# Patient Record
Sex: MALE | Race: AMERICAN INDIAN OR ALASKA NATIVE
[De-identification: names, ages, dates, MRNs, and addresses within clinical notes are randomized per-mention and may not be internally consistent; named-entity substitution may affect disease eponyms.]

---

## 2018-10-31 ENCOUNTER — HOSPITAL ENCOUNTER (OUTPATIENT)
Dept: HOSPITAL 95 - PLD | Age: 78
Discharge: HOME | End: 2018-10-31
Attending: DERMATOLOGY
Payer: MEDICARE

## 2018-10-31 DIAGNOSIS — L28.0: Primary | ICD-10-CM

## 2019-02-21 ENCOUNTER — HOSPITAL ENCOUNTER (INPATIENT)
Dept: HOSPITAL 95 - ER | Age: 79
LOS: 12 days | Discharge: NURSING, REHAB OR LONG-TERM CARE | DRG: 870 | End: 2019-03-05
Attending: HOSPITALIST | Admitting: HOSPITALIST
Payer: MEDICARE

## 2019-02-21 VITALS — WEIGHT: 206.57 LBS | HEIGHT: 67.99 IN | BODY MASS INDEX: 31.31 KG/M2

## 2019-02-21 DIAGNOSIS — Z79.82: ICD-10-CM

## 2019-02-21 DIAGNOSIS — R41.0: ICD-10-CM

## 2019-02-21 DIAGNOSIS — J18.9: ICD-10-CM

## 2019-02-21 DIAGNOSIS — Z87.891: ICD-10-CM

## 2019-02-21 DIAGNOSIS — D69.6: ICD-10-CM

## 2019-02-21 DIAGNOSIS — I11.0: ICD-10-CM

## 2019-02-21 DIAGNOSIS — J96.01: ICD-10-CM

## 2019-02-21 DIAGNOSIS — I48.91: ICD-10-CM

## 2019-02-21 DIAGNOSIS — R65.20: ICD-10-CM

## 2019-02-21 DIAGNOSIS — E87.0: ICD-10-CM

## 2019-02-21 DIAGNOSIS — Z78.1: ICD-10-CM

## 2019-02-21 DIAGNOSIS — Z95.5: ICD-10-CM

## 2019-02-21 DIAGNOSIS — E11.9: ICD-10-CM

## 2019-02-21 DIAGNOSIS — I50.33: ICD-10-CM

## 2019-02-21 DIAGNOSIS — A41.9: Primary | ICD-10-CM

## 2019-02-21 LAB
ALBUMIN SERPL BCP-MCNC: 3.6 G/DL (ref 3.4–5)
ALBUMIN/GLOB SERPL: 0.7 {RATIO} (ref 0.8–1.8)
ALT SERPL W P-5'-P-CCNC: 50 U/L (ref 12–78)
ANION GAP SERPL CALCULATED.4IONS-SCNC: 7 MMOL/L (ref 6–16)
ANION GAP SERPL CALCULATED.4IONS-SCNC: 9 MMOL/L (ref 6–16)
AST SERPL W P-5'-P-CCNC: 54 U/L (ref 12–37)
BASOPHILS # BLD AUTO: 0.08 K/MM3 (ref 0–0.23)
BASOPHILS NFR BLD AUTO: 0 % (ref 0–2)
BILIRUB SERPL-MCNC: 2.3 MG/DL (ref 0.1–1)
BUN SERPL-MCNC: 22 MG/DL (ref 8–24)
BUN SERPL-MCNC: 26 MG/DL (ref 8–24)
CALCIUM SERPL-MCNC: 8.4 MG/DL (ref 8.5–10.1)
CALCIUM SERPL-MCNC: 9 MG/DL (ref 8.5–10.1)
CHLORIDE SERPL-SCNC: 106 MMOL/L (ref 98–108)
CHLORIDE SERPL-SCNC: 107 MMOL/L (ref 98–108)
CO2 SERPL-SCNC: 21 MMOL/L (ref 21–32)
CO2 SERPL-SCNC: 22 MMOL/L (ref 21–32)
CREAT SERPL-MCNC: 0.81 MG/DL (ref 0.6–1.2)
CREAT SERPL-MCNC: 0.91 MG/DL (ref 0.6–1.2)
DEPRECATED RDW RBC AUTO: 64.3 FL (ref 35.1–46.3)
DEPRECATED RDW RBC AUTO: 66.5 FL (ref 35.1–46.3)
EOSINOPHIL # BLD AUTO: 0.18 K/MM3 (ref 0–0.68)
EOSINOPHIL NFR BLD AUTO: 1 % (ref 0–6)
ERYTHROCYTE [DISTWIDTH] IN BLOOD BY AUTOMATED COUNT: 16.9 % (ref 11.7–14.2)
ERYTHROCYTE [DISTWIDTH] IN BLOOD BY AUTOMATED COUNT: 17 % (ref 11.7–14.2)
GLOBULIN SER CALC-MCNC: 5.3 G/DL (ref 2.2–4)
GLUCOSE SERPL-MCNC: 178 MG/DL (ref 70–99)
GLUCOSE SERPL-MCNC: 179 MG/DL (ref 70–99)
HCT VFR BLD AUTO: 33.8 % (ref 37–53)
HCT VFR BLD AUTO: 41.2 % (ref 37–53)
HCT VFR BLD AUTO: 43.2 % (ref 37–53)
HGB BLD-MCNC: 10.7 G/DL (ref 13.5–17.5)
HGB BLD-MCNC: 12.7 G/DL (ref 13.5–17.5)
HGB BLD-MCNC: 13.2 G/DL (ref 13.5–17.5)
IMM GRANULOCYTES # BLD AUTO: 0.09 K/MM3 (ref 0–0.1)
IMM GRANULOCYTES NFR BLD AUTO: 0 % (ref 0–1)
LYMPHOCYTES # BLD AUTO: 2.58 K/MM3 (ref 0.84–5.2)
LYMPHOCYTES NFR BLD AUTO: 12 % (ref 21–46)
MCHC RBC AUTO-ENTMCNC: 30.6 G/DL (ref 31.5–36.5)
MCHC RBC AUTO-ENTMCNC: 31.7 G/DL (ref 31.5–36.5)
MCV RBC AUTO: 103 FL (ref 80–100)
MCV RBC AUTO: 106 FL (ref 80–100)
MONOCYTES # BLD AUTO: 2.27 K/MM3 (ref 0.16–1.47)
MONOCYTES NFR BLD AUTO: 11 % (ref 4–13)
NEUTROPHILS # BLD AUTO: 15.81 K/MM3 (ref 1.96–9.15)
NEUTROPHILS NFR BLD AUTO: 75 % (ref 41–73)
NRBC # BLD AUTO: 0 K/MM3 (ref 0–0.02)
NRBC # BLD AUTO: 0.02 K/MM3 (ref 0–0.02)
NRBC BLD AUTO-RTO: 0 /100 WBC (ref 0–0.2)
NRBC BLD AUTO-RTO: 0.1 /100 WBC (ref 0–0.2)
PH BLDA: 7.24 [PH] (ref 7.35–7.45)
PH BLDA: 7.24 [PH] (ref 7.35–7.45)
PLATELET # BLD AUTO: 115 K/MM3 (ref 150–400)
PLATELET # BLD AUTO: 88 K/MM3 (ref 150–400)
POTASSIUM SERPL-SCNC: 5 MMOL/L (ref 3.5–5.5)
POTASSIUM SERPL-SCNC: 5.1 MMOL/L (ref 3.5–5.5)
PROT SERPL-MCNC: 8.9 G/DL (ref 6.4–8.2)
PROT UR STRIP-MCNC: (no result) MG/DL
SODIUM SERPL-SCNC: 136 MMOL/L (ref 136–145)
SODIUM SERPL-SCNC: 136 MMOL/L (ref 136–145)
SP GR SPEC: 1.02 (ref 1–1.02)
TROPONIN I SERPL-MCNC: 0.01 NG/ML (ref 0–0.04)
UROBILINOGEN UR STRIP-MCNC: (no result) MG/DL

## 2019-02-21 PROCEDURE — 5A1955Z RESPIRATORY VENTILATION, GREATER THAN 96 CONSECUTIVE HOURS: ICD-10-PCS | Performed by: HOSPITALIST

## 2019-02-21 PROCEDURE — 0BH17EZ INSERTION OF ENDOTRACHEAL AIRWAY INTO TRACHEA, VIA NATURAL OR ARTIFICIAL OPENING: ICD-10-PCS | Performed by: HOSPITALIST

## 2019-02-21 PROCEDURE — C9113 INJ PANTOPRAZOLE SODIUM, VIA: HCPCS

## 2019-02-21 PROCEDURE — C1751 CATH, INF, PER/CENT/MIDLINE: HCPCS

## 2019-02-21 NOTE — NUR
SHIFT SUMMARY:
 
APPROVAL RECIEVED TO USE PICC LINE AND OG TUBE. TUBE FEED STARTED PER DIETARY
RECOMMENDATIONS. PICC LINE PLACED BY AGUSTO AND SONDRA RNS. FAMILY LEFT DURING
INTUBATION. VENT SETTINGS AC 18/450/100%/5. AC CHANGED AFTER DR LOTT
REVIEWED ABG RESULT. PLAN FOR SECOND RESULT TOMORROW AM. LEVOPHED AT
10MCG/MIN. PROPOFOL GTT RUNNING. DELGADILLO CATHETER DRAINING  FOR OUTPUT
NOTED. REPORT GIVEN TO HERNANDEZ SCHMIDT. NO FURTHER NEEDS OR CONCERNS NOTED AT THIS
TIME

## 2019-02-21 NOTE — NUR
DELGADILLO PLACED FOR I AND O AND TEMP MONITORING. PT NOTED TO BE DIAPHORETIC.
DISCUSSED WITH DR LOTT. HR CONTROLLED AFTER CARDIZEM BOLUS AND CARDIZEM GTT
AT 15MG.HR. NOTED TO BE IN AFLUTTER SINCE EVENT AT 1030. DR LOTT AWARE OF
RHYTHM CHANGE AND CURRENT STATUS. TECH IN ROOM PERFORMING PERIPHERAL DOPPLERS.
FAMILY STEPPED OUT FOR PROCEDURES.

## 2019-02-21 NOTE — NUR
ASSUMED CARE OF PT
PT BROUGHT TO ICU FROM ED ON BIPAP. PT ALERT AND ABLE TO NOD YES/NO TO
QUESTIONS. PT ON CARDIZEM DRIP AT 10 AND NS @125.  PT'S WIFE AT BEDSIDE ABLE
TO ANSWER PT MEDICAL HX QUESTIONS. PT COARSE THROUGHOUT, PT'S WIFE STATES THAT
PT HAS HAD COUGH FOR UNKNOWN PERIOD OF TIME AND IS ABLE TO PRODUCE SPUTUM ON
OCCASION.  PT'S WIFE STATES PT DOES NOT USE OXYGEN. PT HAS BECOME INCREASINGLY
WEAK OVER THE PAST WEEK AND PT HAS NEEDED TO USE A WALKER TO ASSIST WITH
AMBULATION. PT HYPOTENSIVE WITH MAP BETWEEN 50-70. SEE FULL SHIFT ASSESSMENT.

## 2019-02-21 NOTE — NUR
PT'S BLOOD PRESSURE CONTINUED TO DECREASE. DR LOTT AWARE, INSTRUCTED TO STOP
CARDIZEM AT THIS TIME. PLANNING TO INTUBATE. RT AND CHARGE RN AWARE

## 2019-02-21 NOTE — NUR
ASSUMED CARE:
 
PT RESTING IN BED, C/O BIPAP MASK AND FEELING CLAUSTERPHOBIC, WANTS IT
REMOVED, AT 12/6 CURRENTLY. EXPLAINED THAT THIS IS NEEDED AT THIS TIME AND A
BREAK WILL BE GIVEN SHORTLY. PT REMAINS HYPOTENSIVE DISCUSSED WITH CHARGE RN,
CALL TO DR WHITE. FLUID BOLUS STARTED,  STATES HE WILL BE HERE TO SEE PT IN
FEW MINUTES. FAMILY AWARE

## 2019-02-21 NOTE — NUR
AFTER 600MLS, PT'S BP HAS NOT IMPROVED. CALL TO DR NICANOR CALVO TO
BOLUS 2 MORE LITERS AND IF NO IMPROVEMENT AFTER 1L, PICC WILL NEED PLACED AND
LEVOPHED STARTED. STATES HE WILL BE HERE TO SEE PT SHORTLY

## 2019-02-21 NOTE — NUR
DR LOTT CAME IN TO SEE PT AND DECIDED THAT INTUBATION WAS NEEDED. FAMILY
EXITED ROOM. AT 1515 PROPOFOL STARTED FOR SEDATION. TUBE IN AT 1519. TUBE SIZE
8.0 WITH 26 AT THE LIP. ET SETTINGS AC 15/450/100%/5
PROPOFOL RUNNING AT 20 MCG/MIN, RESTRAINTS APPLIED. AT
1530 BLOOD PRESSURE DECREASED WITH SBP IN 50S. PROPOFOL TURNED DOWN TO 15
MCG/MIN, LR BOLUS STARTED. AT 1551 PROPOFOL TURNED DOWN TO 10 MCG/MIN. OG
INSERTED. RT IN ROOM AT THIS TIME APPLYING IN LINE SUCTION. XRAY TO CONFIRM
PLACEMENT COMPLETED. WAITING FOR CALL FROM RADIOLOGY TO OK USE OF OG

## 2019-02-21 NOTE — NUR
PT WAS HAVING INCREASED WORK OF BREATHING, SATTING IN MID 60S AFTER 2L FLUIDS
COMPLETED. LEANING FORWARD IN BED WITH BIPAP TURNED UP % FIO2. DR WHITE
CALLED. ORDERED TO STOP FLUIDS AND FOR INTENSIVIST CONSULT. DR LOTT IN TO
SEE PT. DIRECTED FOR CARDIZEM TO BE RESTARTED AND TO ADMINISTER CARDIZEM
BOLUS. SEE NEW ORDERS

## 2019-02-21 NOTE — NUR
ASSUMED CARE OF PT
REPORT RECEIVED FROM ROXANA MASON. PT INTUBATED AND SEDATED. VENT SETTINGS AC
18/450/5/100%. LUNG SOUNDS COARSE THROUGHOUT.
LEVOPHED AT 8 MCG/MIN TO MAINTAIN BP. PT HAS IRREGULAR HEART RHYTHM AND HAS
BEEN IN AND OUT OF AFLUTTER FOR MOST OF THE DAY. CURRENT /69. HR 66.
PROPOFOL 40 MCG/KG/MIN RUNNING FOR SEDATION
PT IS NOT OPENING HIS EYES TO VERBAL OR PAINFUL STIMULATION BUT
WITHDRAWS FROM PAINFUL STIMULI.
VITAL HIGH PROTEIN RUNNING
AT 25 ML/HR WITH ORDER TO INCREASE 10 ML Q8 UNTIL GOAL RATE OF 45 ML/HR
REACHED. PT HAD 70 ML RESIDUALS.
TEMP DELGADILLO PATENT AND DRAINING. SEE FULL SHIFT ASSESSMENT.

## 2019-02-22 LAB
ANION GAP SERPL CALCULATED.4IONS-SCNC: 8 MMOL/L (ref 6–16)
BASOPHILS # BLD AUTO: 0.02 K/MM3 (ref 0–0.23)
BASOPHILS NFR BLD AUTO: 0 % (ref 0–2)
BUN SERPL-MCNC: 38 MG/DL (ref 8–24)
CALCIUM SERPL-MCNC: 8.2 MG/DL (ref 8.5–10.1)
CHLORIDE SERPL-SCNC: 109 MMOL/L (ref 98–108)
CO2 SERPL-SCNC: 21 MMOL/L (ref 21–32)
CREAT SERPL-MCNC: 1.04 MG/DL (ref 0.6–1.2)
DEPRECATED RDW RBC AUTO: 65 FL (ref 35.1–46.3)
EOSINOPHIL # BLD AUTO: 0.01 K/MM3 (ref 0–0.68)
EOSINOPHIL NFR BLD AUTO: 0 % (ref 0–6)
ERYTHROCYTE [DISTWIDTH] IN BLOOD BY AUTOMATED COUNT: 16.9 % (ref 11.7–14.2)
GLUCOSE SERPL-MCNC: 234 MG/DL (ref 70–99)
HCT VFR BLD AUTO: 35.7 % (ref 37–53)
HGB BLD-MCNC: 11.1 G/DL (ref 13.5–17.5)
IMM GRANULOCYTES # BLD AUTO: 0.12 K/MM3 (ref 0–0.1)
IMM GRANULOCYTES NFR BLD AUTO: 1 % (ref 0–1)
LYMPHOCYTES # BLD AUTO: 1.09 K/MM3 (ref 0.84–5.2)
LYMPHOCYTES NFR BLD AUTO: 8 % (ref 21–46)
MAGNESIUM SERPL-MCNC: 1.9 MG/DL (ref 1.6–2.4)
MCHC RBC AUTO-ENTMCNC: 31.1 G/DL (ref 31.5–36.5)
MCV RBC AUTO: 104 FL (ref 80–100)
MONOCYTES # BLD AUTO: 0.65 K/MM3 (ref 0.16–1.47)
MONOCYTES NFR BLD AUTO: 5 % (ref 4–13)
NEUTROPHILS # BLD AUTO: 11.96 K/MM3 (ref 1.96–9.15)
NEUTROPHILS NFR BLD AUTO: 86 % (ref 41–73)
NRBC # BLD AUTO: 0.02 K/MM3 (ref 0–0.02)
NRBC BLD AUTO-RTO: 0.1 /100 WBC (ref 0–0.2)
PH BLDA: 7.35 [PH] (ref 7.35–7.45)
PHOSPHATE SERPL-MCNC: 3.1 MG/DL (ref 2.5–4.9)
PLATELET # BLD AUTO: 90 K/MM3 (ref 150–400)
POTASSIUM SERPL-SCNC: 4.3 MMOL/L (ref 3.5–5.5)
SODIUM SERPL-SCNC: 138 MMOL/L (ref 136–145)
TROPONIN I SERPL-MCNC: 0.14 NG/ML (ref 0–0.04)

## 2019-02-22 NOTE — NUR
DR MARINO WAS IN TO SEE PT THIS AM. FIO2 DECREASED TO 60% PER RT. DR MARINO
STATED THAT OG NEEDED TO BE ADVANCED PER XRAY REPORT, TASK COMPLETED. DURING
SEDATION VACATION, PT WAS ABLE TO WIGGLE FINGERS AND TOES, SHAKE HEAD OR NOD,
WINCES EYEBROWS AND OPENS EYES. PROPOFOL DECREASED TO 20 MCG/KG/MIN AND
LEVOPHED DECREASED TO 3. WIFE AT BEDSIDE AT THIS TIME. EDUCATED ABOUT PT'S
CONDITION AND EQUIPMENT. NO FURTHER NEEDS OR CONCERNS AT THIS TIME.

## 2019-02-22 NOTE — NUR
PT CORE BODY TEMP CURRENTLY AT 98.4. LEVOPHED AT 5 MCG/MIN /59 (72)
PROPOFOL @25 MCG/KG/MIN. PT STILL NOT RESPONSIVE TO VERBAL STIMULI BUT
WITHDRAWS FROM PAINFUL STIMULUS. VENT SETTINGS REMAIN THE SAME O2 SATS AT 93%.

## 2019-02-22 NOTE — NUR
SELF EXTUBATION
AT APPROX 2216 PT ROUNDING DONE. PT APPEARED TO BE SLEEPING IN NO APPARENT
DISTRESS WITH RESTRAINTS IN PLACE.
AT APPROX 2220 THIS RN HEARD VENT ALARMS AND ENTERED THE ROOM. PT WAS FOUND TO
HAVE ET TUBE AND FASTENING DEVICE DISPLACED. PT APPEARED TO HAVE SLID SELF
DOWN IN BED AND PULLED ON SUCTION LINE.
RTJANA WAS CALLED TO ROOM AS WELL AS CHARGE NURSE, LESLEE. TUBE
APPEARED TO BE DISPLACED AND WAS UNABLE TO BE REPLACED TO PREVIOUS POSITION.
PT BEGAN TO DESATURATE INTO THE MID 80'S. PT FULLY EXTUBATED AND BAGGED TO
MAINTAIN SATURATIONS. PT DID NOT APPEAR TO HAVE ADEQUATE RESPIRATORY DRIVE
TO MAINTAIN SATURATIONS WHILE EXTUBATED. DR AUGUSTINE, FROM ER, WAS CALLED AND
INFORMED.
DR AUGUSTINE ARRIVED TO ROOM TO PERFORM REINTUBATION. INTUBATION PERFORMED AT 2230
BY DR AUGUSTINE WITH ASSISTANCE BY RT. PT PROVIDED 20 OF ETOMIDATE  OF SUCCS
AT 2230. 8.0 ET TUBE INSERTED AT A LENGTH OF 24 AT THE TEETH AT 2231.
REPLACEMENT OG TUBE PLACED BY THIS RN AT 2238.
PORTABLE X-RAY PERFORMED AND READ BY DR AUGUSTINE, INSTRUCTIONS RECEIVED TO ADVANCE
TUBE TO 26 AT TEETH. TUBE ADVANCED BY RT.
DR MARINO CALLED AND INFORMED OF PT'S SELF EXTUBATION. INSTRUCTIONS RECEIVED
TO INCREASE SEDATION AND TO PERFORM REPEAT X-RAY AFTER ADVANCEMENT OF TUBE.
PROPOFOL INCREASED TO 30MCG/KG/MIN AS INSTRUCTED AND ORDER FOR X-RAY ENTERED
AS RECEIVED.
REPEAT X-RAY PERFORMED. AT 2313 REPEAT X-RAY WAS READ BY DR AUGUSTINE AND
CONFIRMATION OF OG AND ET TUBE PLACEMENT WAS CONFIRMED. TUBE FEEDING RESUMED
AT THAT TIME.
WILL CONTINUE TO MONITOR PT.

## 2019-02-22 NOTE — NUR
ASSUMING CARE
RECEIVED PT REPORT FROM ROXANA MASON. PT IS INTUBATED AT THIS TIME. PT VENT
SETTINGS ARE CURRENTLY AC 18, , FIO2 60% AND PEEP 5. PT SPO2 IS
MAINTAINING IN THE HIGH 90'S AT THIS TIME.
PT LUNG SOUNDS ARE OCCASIONALLY COARSE AND DIMINISHED IN THE BASES.
PT IS RECEIVING LOW DOSE LEVOPHED AT A RATE OF 2 MCG/MIN. PT BP APPEARS TO BE
STABLE AT THIS TIME AND WILL BE TITRATED DOWN THROUGH THE NIGHT AS
APPROPRIATE.
PT IS RECIVING PROPOFOL AT 20MCG/KG/MIN. PT IS AROUSABLE AND IS ABLE TO
FOLLOW COMMANDS. PT APPEARS TO NOD TO QUESTIONS APPROPRIATELY.
PT IS RECEIVING NS AT [facility] FOR INFUSION OF ANTIBIOTICS.
PT HR IS IN -110'S PT APPEARS TO BE IN A-FIB AT TIMES. ALL PULSES ARE
PRESENT AND PALPABLE.
PT IS RECEIVING TUBE FEEDING OF VITAL HIGH SOLUTION AT A GOAL RATE OF 45ML/HR.
PT HAS MINIMAL RESIDUALS AT THIS ITME, THOUGH APPROX 200 CC OF AIR WAS REMOVED
WITH RESIDUAL CHECK. WILL CONTINUE TO MONITOR.
PT HAS A DELGADILLO TEMP PROBE IN PLACE, CURRENTLY PATENT AND DRAINING AT THIS
TIME.
ASSUMING CARE OF PT AT THE TIME OF SHIFT REPORT. WILL CONTINUE TO MONITOR PT.

## 2019-02-22 NOTE — NUR
SHIFT SUMMARY
PATIENT STABLE THROUGHOUT SHIFT WITH NO ACUTE CHANGES. DAVID HUGGER TURNED OFF
AND PT'S TEMP CURRENTLY AT 98.1.
VENT SETTINGS AC 18/450/5/70%. PT SEDATED ON 30 MCG/KG/MIN PROPOFOL. LEVOPHED
ON 5 MCG/MIN.
DURING SEDATION VACATION PT ABLE TO OPEN EYES TO VERBAL STIMULUS, FOLLOW
COMMANDS AND NOD HEAD YES/NO IN RESPONSE TO QUESTIONS.
THICK SECRETIONS SUCTIONED OUT OF ETT TUBE. SPUTUM SAMPLE SENT TO LAB.
WILL REPORT TO DAYSHIFT NURSE.

## 2019-02-22 NOTE — NUR
ASSUMED CARE:
 
PT APPEARS COMFORTABLE. VENT SETTINGS AC 18/450/65%/5. LEVOPHED AT 5MCG/MIN.
PROPOFOL AT 30 MCG/KG/MIN. RT IN ROOM TO SEE PT. DELGADILLO DRAINING YELLOW URINE.
RESTRAINTS TO BILATERAL WRISTS. NO ACUTE NEEDS OR CONCERNS AT THIS TIME

## 2019-02-22 NOTE — NUR
PT HYPOTHERMIC WITH TEMP OF 95.5 FOR SEVERAL HOURS DESPITE BEING COVERED IN
WARM BLANKETS AND HEAT IN ROOM TURNED UP. PLACED PATIENT UNDER DAVID HUGGER,
WILL CONTINUE TO MONITOR PT'S CORE TEMP.

## 2019-02-22 NOTE — NUR
SHIFT SUMMARY:
 
PT APPEARS COMFORTABLE AT THIS TIME. VENT AC 18/450/60%/5. PROPOFOL AT 20
MCG/KG/MIN AND HE NODS OR SHAKES HEAD AND FOLLOWS INSTRUCTIONS. LEVOPHED AT
2MCG/MIN. FAMILY AT BEDSIDE MOST OF SHIFT. TUBE FEED RUNNING AT GOAL WITH
HIGHEST RESIDUAL OF 80 NOTED. NO FURTHER NEEDS OR CONCERNS.

## 2019-02-22 NOTE — NUR
SEDATION VACATION.
PT OPENS EYES TO VERBAL STIMULUS AND FOLLOWS DIRECTIONS. PT ABLE TO NOD HEAD
YES/NO IN RESPONSE TO QUESTIONS.

## 2019-02-23 LAB
ALBUMIN SERPL BCP-MCNC: 2.4 G/DL (ref 3.4–5)
ALBUMIN/GLOB SERPL: 0.6 {RATIO} (ref 0.8–1.8)
ALT SERPL W P-5'-P-CCNC: 37 U/L (ref 12–78)
ANION GAP SERPL CALCULATED.4IONS-SCNC: 6 MMOL/L (ref 6–16)
AST SERPL W P-5'-P-CCNC: 31 U/L (ref 12–37)
BASOPHILS # BLD AUTO: 0.01 K/MM3 (ref 0–0.23)
BASOPHILS NFR BLD AUTO: 0 % (ref 0–2)
BILIRUB SERPL-MCNC: 0.9 MG/DL (ref 0.1–1)
BUN SERPL-MCNC: 33 MG/DL (ref 8–24)
CALCIUM SERPL-MCNC: 8.3 MG/DL (ref 8.5–10.1)
CHLORIDE SERPL-SCNC: 112 MMOL/L (ref 98–108)
CO2 SERPL-SCNC: 23 MMOL/L (ref 21–32)
CREAT SERPL-MCNC: 0.82 MG/DL (ref 0.6–1.2)
DEPRECATED RDW RBC AUTO: 63.9 FL (ref 35.1–46.3)
EOSINOPHIL # BLD AUTO: 0.01 K/MM3 (ref 0–0.68)
EOSINOPHIL NFR BLD AUTO: 0 % (ref 0–6)
ERYTHROCYTE [DISTWIDTH] IN BLOOD BY AUTOMATED COUNT: 16.9 % (ref 11.7–14.2)
GLOBULIN SER CALC-MCNC: 4 G/DL (ref 2.2–4)
GLUCOSE SERPL-MCNC: 280 MG/DL (ref 70–99)
HCT VFR BLD AUTO: 31.5 % (ref 37–53)
HGB BLD-MCNC: 10 G/DL (ref 13.5–17.5)
IMM GRANULOCYTES # BLD AUTO: 0.06 K/MM3 (ref 0–0.1)
IMM GRANULOCYTES NFR BLD AUTO: 1 % (ref 0–1)
LYMPHOCYTES # BLD AUTO: 0.73 K/MM3 (ref 0.84–5.2)
LYMPHOCYTES NFR BLD AUTO: 7 % (ref 21–46)
MAGNESIUM SERPL-MCNC: 2.1 MG/DL (ref 1.6–2.4)
MCHC RBC AUTO-ENTMCNC: 31.7 G/DL (ref 31.5–36.5)
MCV RBC AUTO: 103 FL (ref 80–100)
MONOCYTES # BLD AUTO: 0.61 K/MM3 (ref 0.16–1.47)
MONOCYTES NFR BLD AUTO: 6 % (ref 4–13)
NEUTROPHILS # BLD AUTO: 8.51 K/MM3 (ref 1.96–9.15)
NEUTROPHILS NFR BLD AUTO: 86 % (ref 41–73)
NRBC # BLD AUTO: 0 K/MM3 (ref 0–0.02)
NRBC BLD AUTO-RTO: 0 /100 WBC (ref 0–0.2)
PHOSPHATE SERPL-MCNC: 1.9 MG/DL (ref 2.5–4.9)
PLATELET # BLD AUTO: 69 K/MM3 (ref 150–400)
POTASSIUM SERPL-SCNC: 4.4 MMOL/L (ref 3.5–5.5)
PROT SERPL-MCNC: 6.4 G/DL (ref 6.4–8.2)
SODIUM SERPL-SCNC: 141 MMOL/L (ref 136–145)
VANCOMYCIN TROUGH SERPL-MCNC: 7.7 UG/ML (ref 5–10)

## 2019-02-23 NOTE — NUR
ASSUMED CARE / DR. WHITE:
REPORT RECEIVED FROM MAXINE MENDOZA RN. ASSUMED CARE OF THIS PT AT APPROX 0700.
ON ASSESSMENT, THE PT IS RESTING QUIETLY. HE REMAINS SEDATED/INTUBATED. BILAT
SOFT WRIST RESTRAINTS IN PLACE. HE IS SHOWING NO S/SX PAIN AT THIS TIME. HIS
WIFE NOW AT BEDSIDE.
 
PROVIDER IN ROOM TO SEE PT. ORDERS PLACED. DISCUSSED POC.
WILL CONTINUE TO MONITOR & UPDATE AS NEEDED.

## 2019-02-23 NOTE — NUR
ASSUMING CARE
RECEIVED PT REPORT FROM ROXANA DE LA CRUZ AND ROXANA OLMSTEAD. PT REMAINS VENTED AT THIS
TIME. VENT SETTINGS ARE AC 18 , FIO2 50%, PEEP 5. PT SPO2 IS IN THE MID
TO LOW 90'S. PT IS RECEIVING Q6 CHEST PRECUSSION.
PT IS RECEIVING NS AT TKO AND PROPOFOL AT 30MCG/KG/MIN AT THE TIME CARE WAS
ASSUMED. SHORTLY AFTER CARE ASSUMED PROPOFOL WAS REDUCED TO 25MCG/KG/MIN.
PT IS DIFFICULT TO AROUSE AT TIMES BUT WILL RESPOND TO VERBAL STIMULATION.
PT REMAINS RESTRAINED IN BILATERAL SOFT WRIST RESTRAINTS AT THIS TIME.
PT CONTINUES TO HAVE DELGADILLO TEMP PROBE IN PLACE, CATHETER IS PATENT AND
DRAINING CLEAR YELLOW URINE.
PT IS RECEIVING TUBE FEEDING AT GOAL RATE OF 45ML/HR. PT IS RECEIVING VITAL
HIGH TUBE FEEDING SOLUTION. PT HAS APPROX 35 TO 40ML OF RESIDUALS, RESIDUALS
WERE FOUND TO HAVE A DARK PINK/RED TINGE. DR MARINO WAS CALLED AND INFORMED OF
CHANGE IN COLOR OF RESIDUALS. ORDER ENTERED BY DR MARINO FOR PROTONIX NOW AND
0600. INSTRUCTIONS RECEIVED TO CONTINUE TO MONITOR VITAL SIGNS AND FOR ANY
SIGNS OF GIB.
PT HR IS IN -110'S AT THIS TIME, PT IS IN A-FIB. PT BP IS IN 'S
AT THIS TIME.
PT HAD A BOWEL MOVEMENT AT THE TIME OF INITIAL ASSESSMENT, BM WAS BROWN AND
LIQUID/LOOSE. THERE WERE NO SIGNS OF BLOOD OR A BLEED NOTED IN STOOL.
ASSUMING CARE OF PT AT THE TIME OF SHIFT REPORT. WILL CONTINUE TO MONITOR.

## 2019-02-23 NOTE — NUR
SHIFT SUMMARY:
PT REMAINS SEDATED/INTUBATED, HE IS FOLLOWING SOME COMMANDS BUT IS OVERALL
MORE DROWSY THAN YESTERDAY ACCORDING TO HIS FAMILY. NO SEDATION VACATION WAS
PERFORMED TODAY R/T DR. MARINO's REQUEST THAT WE DO NOT, SO THAT HE MAY REST
WELL AFTER LAST NIGHT's SELF-EXTUBATION & REINTUBATION. LS REMAIN COARSE T/O,
MINIMAL SPUTUM SUCTIONED FROM ETT, THIS AMNT INCREASED GREATLY AFTER CHEST
PERCUSSION PERFORMED THIS AFTERNOON. SPUTUM AMNT HAS DECREASED AGAIN & REMAINS
TAN IN COLOR. SOME BLOODY SECRETIONS HAVE BEEN SUCTIONED FROM PT's MOUTH &
THROAT, PROVIDER AWARE. MONITOR SHOWS AFIB W/ BBB, HR HAS BEEN 80-90s ON AVG
TODAY BUT HAS INCREASED SLIGHTLY TO 100s. BP ALSO SLIGHTLY INCREASED THIS
AFTERNOON. TF INFUSING THROUGH OGT AT GOAL OF 45 ML/HR, RESIDUALS AS CHARTED.
TEMP DELGADILLO PATENT/DRAINING, PT SLIGHTLY HYPOTHERMIC THIS AM, PROVIDED W/ WARM
BLANKETS & THIS HAS IMPROVED, SKIN IS WARM TO TOUCH.
WILL CONTINUE TO MONITOR & REPORT OFF TO ONCOMING RN.

## 2019-02-23 NOTE — NUR
SHIFT SUMMARY NOTE
AFTER PREVIOUS NOTE PT HAS REMAINED STABLE THROUGH THE NIGHT. PT WAS
TACHYCARDIC IN 'S AND HYPERTENSIVE IN 'S IMMEDIEATELY
AFTER REINTUBATION. LEVOPHED WAS TURNED OFF AT THAT TIME.
LEVOPHED REMAINS OFF AT THIS TIME. PT TOOK APPROX 2 HOURS TO RETURN TO
PREVIOUS HR -110'S. PT BP RETURNED TO 'S SYSTOLIC AFTER APPROX 30
MIN. REFER TO VITAL SIGNS AND GTT FLOW SHEET FOR TIMES AND DETAILS.
PT IS ON 30MCG/KG/MMIN OF PROPOFOL AT THIS TIME. PT APPEARS TO BE COMFORTABLE
AT THIS TIME.
PT VENT SETTINGS REMAIN AC 16, , PEEP 5, FIO2 60%. PT IS MAINTAINING
SPO2 IN THE 90'S AT THIS TIME. PT CONTINUES TO HAVE ORANGE COLORED SPUTUM FROM
ET TUBE.
PT CONTNUES TO HAVE DELGADILLO TEMP PROBE IN PLACE AT THIS TIME. URINE IS PADMA
COLORED. PT TEMPERATURE INCREASED TO 99.1 THROUGH APPROX 0000 TO 0100. PT
TEMPERATURE DECLINED INTO THE 96.6 RANGE TOWARD THE END OF THE SHIFT. WARM
BLANKETS APPLIED.
PT REMAINS ON TUBE FEEDING AT THIS TIME AT A GOAL RATE OF 45ML/HR. PT HAS HAD
MINIMAL RESIDUALS THROUGH THE NIGHT.
PT REMAINS RESTRAINED AT THIS TIME.
[facility].

## 2019-02-24 LAB
ANION GAP SERPL CALCULATED.4IONS-SCNC: 6 MMOL/L (ref 6–16)
BASOPHILS # BLD AUTO: 0 K/MM3 (ref 0–0.23)
BASOPHILS NFR BLD AUTO: 0 % (ref 0–2)
BUN SERPL-MCNC: 33 MG/DL (ref 8–24)
CALCIUM SERPL-MCNC: 8.3 MG/DL (ref 8.5–10.1)
CHLORIDE SERPL-SCNC: 113 MMOL/L (ref 98–108)
CO2 SERPL-SCNC: 24 MMOL/L (ref 21–32)
CREAT SERPL-MCNC: 0.7 MG/DL (ref 0.6–1.2)
DEPRECATED RDW RBC AUTO: 63.5 FL (ref 35.1–46.3)
EOSINOPHIL # BLD AUTO: 0 K/MM3 (ref 0–0.68)
EOSINOPHIL NFR BLD AUTO: 0 % (ref 0–6)
ERYTHROCYTE [DISTWIDTH] IN BLOOD BY AUTOMATED COUNT: 16.8 % (ref 11.7–14.2)
GLUCOSE SERPL-MCNC: 312 MG/DL (ref 70–99)
HCT VFR BLD AUTO: 31.3 % (ref 37–53)
HCT VFR BLD AUTO: 31.7 % (ref 37–53)
HGB BLD-MCNC: 10 G/DL (ref 13.5–17.5)
HGB BLD-MCNC: 9.9 G/DL (ref 13.5–17.5)
IMM GRANULOCYTES # BLD AUTO: 0.09 K/MM3 (ref 0–0.1)
IMM GRANULOCYTES NFR BLD AUTO: 1 % (ref 0–1)
LYMPHOCYTES # BLD AUTO: 0.75 K/MM3 (ref 0.84–5.2)
LYMPHOCYTES NFR BLD AUTO: 11 % (ref 21–46)
MAGNESIUM SERPL-MCNC: 2.3 MG/DL (ref 1.6–2.4)
MCHC RBC AUTO-ENTMCNC: 31.6 G/DL (ref 31.5–36.5)
MCV RBC AUTO: 104 FL (ref 80–100)
MONOCYTES # BLD AUTO: 0.44 K/MM3 (ref 0.16–1.47)
MONOCYTES NFR BLD AUTO: 7 % (ref 4–13)
NEUTROPHILS # BLD AUTO: 5.44 K/MM3 (ref 1.96–9.15)
NEUTROPHILS NFR BLD AUTO: 81 % (ref 41–73)
NRBC # BLD AUTO: 0 K/MM3 (ref 0–0.02)
NRBC BLD AUTO-RTO: 0 /100 WBC (ref 0–0.2)
PHOSPHATE SERPL-MCNC: 2.8 MG/DL (ref 2.5–4.9)
PLATELET # BLD AUTO: 62 K/MM3 (ref 150–400)
POTASSIUM SERPL-SCNC: 4.3 MMOL/L (ref 3.5–5.5)
SODIUM SERPL-SCNC: 143 MMOL/L (ref 136–145)

## 2019-02-24 NOTE — NUR
SHIFT SUMMARY NOTE
PT REMAINS INTUBATED. PT VENT SETTINGS HAVE REMAINED UNCHANGED THROUGH THE
NIGHT. VENT SETTINGS AT THIS TIME ARE AC 18, , FIO2 50%, AND PEEP 5. PT
HAS MAINTAINED SPO2 IN THE 90'S THROUGH THE NIGHT.
PT CONTINUES ON TUBE FEEDING AT GOAL RATE OF 45. PT CONTINUES TO HAVE MINIMAL
RESIDUALS. RESIDUALS APPEAR TO BE MUCH DARKER BROWN AT THIS TIME AND NO
LONGER APPEAR TO HAVE PREVIOUSLY NOTED PINK/RED TINGE. PT HGB WENT FROM 10.0
TO 9.9, AND NO SIGNS OF GIB WERE NOTED IN STOOLS.
PT HAD 3 BM'S OVERNIGHT. STOOLS ARE BROWN AND LIQUID/LOOSE.
PT CONTINUES TO RECEIVE PROPOFOL. PROPOFOL IS CURRENTLY RUNNING AT
30MCG/KG/MIN. PT IS RECEIVING NS AT [facility] AND AZITHROMYCIN AT THIS TIME.
PT CONTINUES TO BE IN BILATERAL SOFT WRIST RESTRAINTS.
PT CONTIUES TO HAVE DELGADILLO TEMP PROBE IN PLACE, DELGADILLO IS PATENT AND DRAINING AT
THIS TIME.
REFER TO VITAL SIGN FLOW SHEET FOR DETAILS OF VITALS.
WILL REPORT OFF TO ONCOMING DAY SHIFT NURSE.

## 2019-02-24 NOTE — NUR
FAMILY MEMBER JUST LEFT.  PT VSS. I/O NOTED STATS NOTED.  PROPOFOL GTT AT
20MCG AND PT ABLE TO FOLLOW COMMANDS.  PT CONT TO HAVE MINIMAL SECREATION OF
YELLOW COLOR.  FIO2 REMAINS AT 40%.

## 2019-02-24 NOTE — NUR
PT SLUGGISH TO RESPOND, PROPOFOL DEC. TO 25MCG FROM 30 AND WILL FOLLOW.  VSS.
RESTRAINED.  TOLERATING TF AT GOAL RATE.  RT IN FOR R CHEST CPT. ROOM AND PT
COOL, TEMP INC. AND WARM BLANKET ADDED.  BLOODY ORAL SECRATIONS NOTED WITH
ORAL CARE AND NO VISUAL EVIDENCE GASTRIC BLOOD WITH RESIDUAL CHECK AT THIS
TIME.

## 2019-02-24 NOTE — NUR
ASSUMING CARE
RECEIVED PT REPROT FROM ROXANA CH. PT REMAINS INTUBATED AT THIS TIME. PT VENT
SETTINGS ARE AC 18, , FIO2 40% AND PEEP 5. PT IS RECEIVING PROPOFOL FOR
SEDATION AT 20MCG/KG/MIN. PT IS RESTRAINED IN BILATERAL SOFT WRIST RESTRAINTS
AT THIS TIME.
PT IS AROUSABLE AND IS ABLE TO NOD IN RESPONSE TO QUESTIONS.
PT IS RECEIVEING TUBE FEEDING OF VITAL HIGH AT GOAL RATE OF 45ML/HR. PT HAS
MINIMAL RESIDUALS AT THIS TIME. NO REMARKABLE DISCOLORATION OF RESIDUALS NOTED
AT TIME OF ASSESSMENT.
PT HAS DELGADILLO TEMP PROBE IN PLACE. PT TEMPERATURE NOTED TO BE APPROX 99.9 AT
THE TIME OF ASSESSMENT, BLANKETS REMOVED.
PT REMAINS IN A-FIB, HR IN -120S AT THIS TIME. PT BP APPEARS TO BE
STABLE. AT THIS TIME.
ASSUMING CARE OF PT AT THE TIME OF SHIFT REPORT. WILL CONTINUE TO MONITOR.

## 2019-02-25 LAB
ANION GAP SERPL CALCULATED.4IONS-SCNC: 9 MMOL/L (ref 6–16)
BASOPHILS # BLD AUTO: 0.01 K/MM3 (ref 0–0.23)
BASOPHILS NFR BLD AUTO: 0 % (ref 0–2)
BUN SERPL-MCNC: 36 MG/DL (ref 8–24)
CALCIUM SERPL-MCNC: 8.3 MG/DL (ref 8.5–10.1)
CHLORIDE SERPL-SCNC: 113 MMOL/L (ref 98–108)
CO2 SERPL-SCNC: 25 MMOL/L (ref 21–32)
CREAT SERPL-MCNC: 0.72 MG/DL (ref 0.6–1.2)
DEPRECATED RDW RBC AUTO: 63.7 FL (ref 35.1–46.3)
EOSINOPHIL # BLD AUTO: 0 K/MM3 (ref 0–0.68)
EOSINOPHIL NFR BLD AUTO: 0 % (ref 0–6)
ERYTHROCYTE [DISTWIDTH] IN BLOOD BY AUTOMATED COUNT: 17.1 % (ref 11.7–14.2)
GLUCOSE SERPL-MCNC: 298 MG/DL (ref 70–99)
HCT VFR BLD AUTO: 31.5 % (ref 37–53)
HGB BLD-MCNC: 10.1 G/DL (ref 13.5–17.5)
IMM GRANULOCYTES # BLD AUTO: 0.04 K/MM3 (ref 0–0.1)
IMM GRANULOCYTES NFR BLD AUTO: 1 % (ref 0–1)
LYMPHOCYTES # BLD AUTO: 0.7 K/MM3 (ref 0.84–5.2)
LYMPHOCYTES NFR BLD AUTO: 11 % (ref 21–46)
MCHC RBC AUTO-ENTMCNC: 32.1 G/DL (ref 31.5–36.5)
MCV RBC AUTO: 102 FL (ref 80–100)
MONOCYTES # BLD AUTO: 0.48 K/MM3 (ref 0.16–1.47)
MONOCYTES NFR BLD AUTO: 7 % (ref 4–13)
NEUTROPHILS # BLD AUTO: 5.25 K/MM3 (ref 1.96–9.15)
NEUTROPHILS NFR BLD AUTO: 81 % (ref 41–73)
NRBC # BLD AUTO: 0.03 K/MM3 (ref 0–0.02)
NRBC BLD AUTO-RTO: 0.5 /100 WBC (ref 0–0.2)
PLATELET # BLD AUTO: 61 K/MM3 (ref 150–400)
POTASSIUM SERPL-SCNC: 4.1 MMOL/L (ref 3.5–5.5)
SODIUM SERPL-SCNC: 147 MMOL/L (ref 136–145)
VANCOMYCIN TROUGH SERPL-MCNC: 13.4 UG/ML (ref 5–10)

## 2019-02-25 NOTE — NUR
SHIFT [place] NOTE
PT DID WELL THROUGH WEAN TRAIL AS PER RT REPORT. PT WAS ABLE TO WAKE UP AND
FOLLOW COMMANDS. PT BP AND HR INCREASED AT THE START OF THE WEAN TRAIL INTO
'S SYSTOLIC 'S HR. PT WAS ABLE TO CALM AND BP AND HR DECREASED
INTO 'S SYSTOLIC 'S HR.
SEDATION AND VENT SETTINGS RESUMED AS PREVIOUS AFTER COMPLETION OF WEAN TRAIL.
VETN SETTINGS AT THIS ITME ARE AC 18, , FIO2 40% AND PEEP 5. PT PROPOFOL
IS RUNNING AT 20MCG/KG/MIN.
PT HAS HAD FREQUENT BOWEL MOVEMENTS THROUGH THE NIGHT. BM'S HAVE BEEN
LOOSE/LIQUID AND BROWN.
PT CONTINUES TO HAVE DELGADILLO CATH IN PLACE, DELGADILLO IS PATENT AND DRAINING AT THIS
TIME.
PT SPO2 IS MAINTAINING IN THE MID TO LOW 90'S AT THIS TIME.
PT CONTINUES TO RECEIVE TUBE FEEDING AT GOAL RATE OF 45ML/HR. PT CONTINUES TO
HAVE MINIMAL RESIDUALS.
PT IS RECEIVING NS AT [facility].
[facility].

## 2019-02-25 NOTE — NUR
0830 PT POSITIONED FOR POSSIBLE EXTUBATION.  WHILE REPOSITIONING PT TRYED TO
HIT BOTH RN AND PCT.  PT SEDATION INC TO 30MCG FOR NOW.  NO RESISDUALS.  BP SL
ELEVATED AND HR AS WELL.  SPOKE WITH DR WHITE AND METOPROLOL DOSE CHANGE TO
FOLLOW.  THICKER SECREATIONS THIS AM AND YET YELLOW.

## 2019-02-25 NOTE — NUR
PT HAS RESTED WELL ON PROPOFOL GTT AT 25MCG. VSS HR  MUCH OF DAY.
REMAINS A-FIB.  I/O NOTED PT REMAINS RESTRAINTS.

## 2019-02-26 LAB
ALBUMIN SERPL BCP-MCNC: 2.4 G/DL (ref 3.4–5)
ALBUMIN/GLOB SERPL: 0.6 {RATIO} (ref 0.8–1.8)
ALT SERPL W P-5'-P-CCNC: 51 U/L (ref 12–78)
ANION GAP SERPL CALCULATED.4IONS-SCNC: 8 MMOL/L (ref 6–16)
AST SERPL W P-5'-P-CCNC: 34 U/L (ref 12–37)
BILIRUB SERPL-MCNC: 0.9 MG/DL (ref 0.1–1)
BUN SERPL-MCNC: 38 MG/DL (ref 8–24)
CALCIUM SERPL-MCNC: 8.5 MG/DL (ref 8.5–10.1)
CHLORIDE SERPL-SCNC: 114 MMOL/L (ref 98–108)
CO2 SERPL-SCNC: 26 MMOL/L (ref 21–32)
CREAT SERPL-MCNC: 0.74 MG/DL (ref 0.6–1.2)
GLOBULIN SER CALC-MCNC: 4.1 G/DL (ref 2.2–4)
GLUCOSE SERPL-MCNC: 291 MG/DL (ref 70–99)
GLUCOSE UR-MCNC: (no result) MG/DL
POTASSIUM SERPL-SCNC: 4 MMOL/L (ref 3.5–5.5)
PROT SERPL-MCNC: 6.5 G/DL (ref 6.4–8.2)
RBC #/AREA URNS HPF: (no result) /HPF (ref 0–2)
SODIUM SERPL-SCNC: 148 MMOL/L (ref 136–145)
SP GR SPEC: 1.01 (ref 1–1.02)
UROBILINOGEN UR STRIP-MCNC: (no result) MG/DL
WBC #/AREA URNS HPF: (no result) /HPF (ref 0–5)

## 2019-02-26 PROCEDURE — 3E033XZ INTRODUCTION OF VASOPRESSOR INTO PERIPHERAL VEIN, PERCUTANEOUS APPROACH: ICD-10-PCS | Performed by: HOSPITALIST

## 2019-02-26 NOTE — NUR
NO SIGNS OF PAIN.  PATIENT AFEBRILE.  SEDATION DECREASED TO 10 MCG/ KG/
MINUTE.  PATIENT RESPONDING TO VERBAL STIMULI AND SIMPLE COMMANDS.  PATIENT
ORIENTED TO FAMILY.  PATIENT REMAINS ON SAME VENT SETTINGS.  PATIENT ALSO
REMAINS IN A. FIB WITH BBB.  HR 80S TO LOW 100S.  BP STABLE.  PATIENT REMAINS
TOLERATING TF WELL.  RESIDUAL OF 5 ML OBTAINED AND REINSTILLED.  NO OTHER
ACUTE CHANGES TO NOTE ON AT THIS TIME.  FAMILY IN ROOM.  WILL CONTINUE TO
MONITOR.

## 2019-02-26 NOTE — NUR
SHIFT SUMMARY
  PATIENT INTUBATED AND SEDATED AT BEGINNING OF SHIFT.  PATIENT EXTUBATED JUST
BEFORE 1400.  PATIENT CONFUSED AND ONLY ORIENTED TO SELF AND FOLLOWING
DIRECTIONS WHEN FIRST EXTUBATED.  PATIENT AGITATED AS WELL.  PATIENT IS NOT
ALERT AND ORIENTED TO SELF, FOLLOWING DIRECTIONS, FAMILY AND TOWN.  PATIENT
STILL SAYING SOME THINGS THAT DO NOT MAKE SENSE.  WIFE ASKED ABOUT BASELINE
NEURO STATUS AND SHE STATES THAT PATIENT IS NORMALLY ALERT AND ORIENTED X 4.
PATIENT ABLE TO BE TAKEN OUT OF RESTRAINTS THIS SHIFT.  PATIENT HAD TMAX OF
99.0 DEGREES FAHRENHEIT.  PATIENT STATES THAT HE DOES HAVE SOME CHRONIC NECK
AND ARM PAIN BUT THAT IT IS MANAGEABLE AT THIS TIME.  PATIENT REMAINS WEAK- OT
AND PT ORDERED TODAY.  PATIENT SATTING WELL ON 2 L NC, BUT DOES TAKE OFF
OCCASIONALLY.  LUNGS WERE RHONCHOROUS THIS AM AND ARE MORE WHEEZY SINCE BEING
EXTUBATED.  PATIENT HAS REMAINED IN A. FIB WITH BBB.  HR 80S TO LOW 100S WHEN
ON PROPOFOL DRIP.  HR INCREASED UP TO 170S AND SBP INCREASED UP TO 170S AFTER
EXTUBATION.  PATIENT BEING GIVEN IV LABETALOL TO KEEP UNDER CONTROL AND HAS
BEEN WORKING.  DR. LOTT AWARE.  TF DC'D ALONG WITH OG WHEN PATIENT
EXTUBATED.  PATIENT HAD 2 MEDIUM, GREEN/ BROWN, LOOSE/ MUCOUSY STOOLS DURING
THIS SHIFT.  C. DIFF SAMPLE SENT.  PATIENT TO HAVE SWALLOW EVAL TOMORROW.
DELGADILLO DRAINING DARK YELLOW, FOUL SMELLING URINE.  UA SENT THIS SHIFT.  60 MG
LASIX GIVEN THIS SHIFT.  LARGE AMOUNT OF OUTPUT.  NO CHANGE IN SKIN.  NS TKO.
BED LOW, CALL LIGHT IN REACH.  REPORT GIVEN TO ASSUMING NIGHT SHIFT NURSE.

## 2019-02-26 NOTE — NUR
PATIENT RESTING QUIETLY IN BED.  PATIENT DOES BECOME AGITATED AT TIMES.
PATIENT CONFUSED.  PATIENT ORIENTED TO SELF, FAMILY, AND FOLLOWING MOST
COMMANDS.  RESTRAINTS REMOVED AS PATIENT IS FOLLOWING COMMANDS AND IS CALM AT
THIS TIME.  PATIENT HAS TEMP OF 99.0 DEGREES FAHRENHEIT.  PATIENT SATTING 92%
ON 2 L NC.  WHEEZES AUSCULTATED T/O LUNG LOBES.  PATIENT HR IS 1-TEENS TO
120S.  BP STABLE AT THIS TIME.  PATIENT BEING GIVEN PRN IV LABETALOL TO HELP
CONTROL BP AND HR.  OG PULLED WHEN PATIENT EXTUBATED.  SPEECH EVAL ORDERED.
NO OTHER ACUTE CHANGE TO NOTE ON AT THIS TIME.  WILL CONTINUE TO MONITOR.

## 2019-02-26 NOTE — NUR
INITIAL ASSESSMENT
  PATIENT INTUBATED AND SEDATED UPON ENTERING ROOM.  PATIENT RESPONDING TO
PAINFUL STIMULI.  PATIENT WEAK BUT LOCALIZING MOVEMENT.  PATIENT HAS NO SIGNS
OF PAIN OR DISCOMFORT AT THIS TIME.  PATIENT AFEBRILE.  PATIENT ON VENT
SETTINGS OF AC 18, , PEEP 5, AND 40% FIO2.  PATIENT SATTING OVER 90%.
RHONCHI NOTED T/O LUNG LOBES.  PATIENT HAS OCCASIONAL COUGH; SCANT AMOUNT OF
THIN, YELLOW SPUTUM SUCTIONED THROUGH ETT.  PATIENT IN A. FIB WITH BBB.  HR
90S TO 1-TEENS.  BP STABLE.  PULSES 1+.  SCDS IN PLACE.  TRACE EDEMA NOTED IN
BLES.  1+ EDEMA NOTED IN BILAT FEET.  ABDOMEN MODERATELY DISTENDED, SOFT, WITH
HYPOACTIVE BS NOTED.  TF INFUSING AT GOAL RATE WITH MINIMAL RESIDUALS.  DELGADILLO
DRAINING DARK YELLOW URINE.  COCCYX REDDENED.  BRUISES NOTED TO BUES.  NS TKO.
 PROPOFOL INFUSING AT 20 MCG/ KG/ MINUTE.  BED LOW, CALL LIGHT IN REACH.  WILL
CONTINUE TO MONITOR PATIENT FREQUENTLY THROUGHOUT SHIFT.

## 2019-02-26 NOTE — NUR
SPOKE TO DR. LOTT ABOUT PATIENT.  PLACED ON SEDATION VACATION FOR WEAN TO
ASSESS RESPIRATORY STATUS.

## 2019-02-26 NOTE — NUR
DR. LOTT UNABLE TO BE REACHED AT THIS TIME.  INFORMED DR. WHITE OF
PATIENT'S INCREASED BP AND HR SINCE EXTUBATION.  OT ORDER RECEIVED.

## 2019-02-26 NOTE — NUR
PATIENT EXTUBATED BY RT AND PRIMARY NURSE AT 1352.  PATIENT TOLERATED WELL.
PATIENT SATTING 92% AND GREATER ON 3 L NC.  WILL CONTINUE TO MONITOR.

## 2019-02-26 NOTE — NUR
RESPIRATORY STATUS
 
PT CONTINUES TO REQUIRE 2 LPM NASAL CANNULA, BUT IS NOTED TO HAVING INCREASED
COUGH AND INCREASED RESPIRATORY RATE. 02 SATS REMAIN IN 90'S. PT REPORTS
FEELING SHORT OF BREATH. PT BOOSTED UP IN BED, HEAD ELEVATED. WILL CONTINUE TO
CLOSELY MONITOR.

## 2019-02-26 NOTE — NUR
PROPOFOL ON STANDBY.  PATIENT ON PRESSURE SUPPORT 7/5, 40% AND TOLERATING
WELL.  PATIENT REMAINS FOLLOWING SIMPLE COMMANDS.  DR. LOTT AND RT, MARIPOSA ALONZO, IN ROOM WITH PATIENT.   WOULD LIKE PATIENT TO WAKE UP A LITTLE BIT
MORE AND THEN WILL EXTUBATE.

## 2019-02-26 NOTE — NUR
TRIED TO CALL PATIENT'S WIFE 2X TO INFORM THAT PATIENT WAS EXTUBATED.  NOT
ABLE TO GET A HOLD OF HER AT THIS TIME.

## 2019-02-26 NOTE — NUR
CARE ASSUMED
 
REPORT RECEIVED, CARE ASSUMED FROM ROXANA PRAJAPATI. UPON ENTERING ROOM, PT HAD
PULLED OFF ARM BAND, 0XYGEN, BLOOD PRESSURE CUFF AND HEART MONITOR. REORIENTED
PATIENT TO PURPOSE OF THESE ITEMS. WHILE COMPLETING ASSESSMENT, PT REPEATEDLY
TAKES OFF ITEMS AND REQUIRES REORIENTATION. PT IS CALM AND COOPERATIVE, THOUGH
FORGETFUL. WHEN ASKED ABOUT LOCATION PT STATES, "PLUMBING." ABLE TO STATE
NAME, BIRTHDAY AND YEAR. PT ASKING QUESTIONS THAT ARE DIFFICULT TO UNDERSTAND
ABOUT VARIOUS UNRELATED THINGS. VITALS STABLE, SEE ASSESSMENT. PT DENIES PAIN,
DISCOMFORT. PROVIDED WITH CALL LIGHT AND AGREES TO CALL FOR NEEDS. BED ALARM
IN PLACE FOR SAFETY.

## 2019-02-26 NOTE — NUR
PROVIDER COMMUNICATION
 
PT CONTINUES TO BE CONFUSED, TAKING OFF EQUIPTMENT, OCCASIONALLY TACHPNEIC
WITH MOVING AROUND IN BED. REDIRECTABLE. SPOKE WITH DR. LOTT TO PROVIDE
UPDATE. DR. LOTT ENCOURAGES CONTINUATION WITH CURRENT PLAN OF CARE. STATES
HE DOESN'T ANTICIPATE PT NEEDING BIPAP AT THIS POINT BUT THAT IT CAN BE PLACED
IF PT DETERIORATES. ALSO STATES HE IS CONCERNED FOR ICU DELIRIUM AND PREFERS
NO SEDATION AT THIS POINT.  WILL CONTINUE TO PROVIDE PT WITH REDIRECTION AND
RELAXATION TECHNIQUES.

## 2019-02-26 NOTE — NUR
SUMMARY
 
PT HAS REMAINED INTUBATED AND SEDATED THROUGHOUT NIGHT, RESTING QUIETLY.
VITALS STABLE. PT AROUSED FOR SEDATION TITRATION THIS MORNING AND FOLLOWED
COMMANDS. SEE ASSESSMENTS. DURING BATH, PT NOTED TO HAVE LOOSE BOWEL MOVEMENT.
UPDATED DR. MILLER. NEW ORDER RECEIVED FOR C-DIFF RULE-OUT. PT TOLERATING
TURNS AND CARES THROUGHOUT NIGHT.

## 2019-02-26 NOTE — NUR
SPOKE TO DR. LOTT.  INFORMED THAT HR AND BP INCREASED UP TO 170S.  INFORMED
THAT DR. WHITE CALLED AND 10 MG IV LABETALOL GIVEN.  INFORMED THAT HR NOW
120S TO 140S AND SBP IN THE 160S.  ALSO INFORMED THAT PATIENT SEEMS AGITATED
AT TIMES, AS WELL AS CONFUSED.  ALSO INFORMED THAT URINE HAS FOUL SMELL.
ORDERS RECEIVED.

## 2019-02-26 NOTE — NUR
SPOKE TO PATIENT'S WIFE AND INFORMED THAT PATIENT EXTUBATED JUST BEFORE 1400.
PATIENT'S WIFE VERY EXCITED TO HEAR THE NEWS.  WIFE ASKED ABOUT PATIENT'S
BASELINE MENTAL STATUS.  WIFE STATES THAT PATIENT IS NORMALLY ALERT AND
ORIENTED X 4, BUT THAT HIS "VOICE IS DIFFERENT THAT MOST PEOPLE'S VOICES AND
IS RASPY".  WIFE STATES THAT HE WILL BE IN AGAIN TO SEE HIM WHEN SHE CAN
BECAUSE OF THE SNOW.

## 2019-02-27 LAB
ANION GAP SERPL CALCULATED.4IONS-SCNC: 5 MMOL/L (ref 6–16)
BASOPHILS # BLD AUTO: 0 K/MM3 (ref 0–0.23)
BASOPHILS NFR BLD AUTO: 0 % (ref 0–2)
BUN SERPL-MCNC: 39 MG/DL (ref 8–24)
CALCIUM SERPL-MCNC: 8.9 MG/DL (ref 8.5–10.1)
CHLORIDE SERPL-SCNC: 116 MMOL/L (ref 98–108)
CO2 SERPL-SCNC: 31 MMOL/L (ref 21–32)
CREAT SERPL-MCNC: 0.8 MG/DL (ref 0.6–1.2)
DEPRECATED RDW RBC AUTO: 63.9 FL (ref 35.1–46.3)
EOSINOPHIL # BLD AUTO: 0.01 K/MM3 (ref 0–0.68)
EOSINOPHIL NFR BLD AUTO: 0 % (ref 0–6)
ERYTHROCYTE [DISTWIDTH] IN BLOOD BY AUTOMATED COUNT: 16.6 % (ref 11.7–14.2)
GLUCOSE SERPL-MCNC: 190 MG/DL (ref 70–99)
HCT VFR BLD AUTO: 36.7 % (ref 37–53)
HGB BLD-MCNC: 11.5 G/DL (ref 13.5–17.5)
IMM GRANULOCYTES # BLD AUTO: 0.06 K/MM3 (ref 0–0.1)
IMM GRANULOCYTES NFR BLD AUTO: 1 % (ref 0–1)
LYMPHOCYTES # BLD AUTO: 0.68 K/MM3 (ref 0.84–5.2)
LYMPHOCYTES NFR BLD AUTO: 8 % (ref 21–46)
MAGNESIUM SERPL-MCNC: 2 MG/DL (ref 1.6–2.4)
MCHC RBC AUTO-ENTMCNC: 31.3 G/DL (ref 31.5–36.5)
MCV RBC AUTO: 104 FL (ref 80–100)
MONOCYTES # BLD AUTO: 0.68 K/MM3 (ref 0.16–1.47)
MONOCYTES NFR BLD AUTO: 8 % (ref 4–13)
NEUTROPHILS # BLD AUTO: 7.25 K/MM3 (ref 1.96–9.15)
NEUTROPHILS NFR BLD AUTO: 84 % (ref 41–73)
NRBC # BLD AUTO: 0.04 K/MM3 (ref 0–0.02)
NRBC BLD AUTO-RTO: 0.5 /100 WBC (ref 0–0.2)
PH BLDA: 7.46 [PH] (ref 7.35–7.45)
PHOSPHATE SERPL-MCNC: 4.2 MG/DL (ref 2.5–4.9)
PLATELET # BLD AUTO: 54 K/MM3 (ref 150–400)
POTASSIUM SERPL-SCNC: 3.8 MMOL/L (ref 3.5–5.5)
SODIUM SERPL-SCNC: 152 MMOL/L (ref 136–145)
VANCOMYCIN TROUGH SERPL-MCNC: 16.7 UG/ML (ref 5–10)

## 2019-02-27 NOTE — NUR
UPDATE - NEURO/RESP ASSESSMENT
 
PT CONTINUES TO BE CONFUSED AND TAKING EQUIPMENT OFF BUT REDIRECTABLE. BED
TURNED TO FACE WINDOW, DISCUSSING WEATHER, CURRENT EVENTS AND PT'S WIFE WITH
PATIENT. PT REQUESTS TO TALK TO WIFE. PHONE DIALED FOR PATIENT. PT'S WIFE
CALLED BACK TO UPDATE AND STATES HE CAN CALL ANY TIME THROUGHOUT THE NIGHT. PT
CONTINUES TO REQUIRE 2 LPM NASAL CANNULA. LUNGS NOTED TO HAVE INCREASED
RHONCHI THROUGHOUT LUNGS AND INCREASINGLY FREQUENT MOIST COUGH. VITALS STABLE.

## 2019-02-27 NOTE — NUR
SHIFT SUMMARY
  PATIENT REMAINED PLEASANT MOST OF SHIFT.  PATIENT SLIGHTLY PARANOID AT
BEGINNING OF SHIFT BUT HAS NOT HAD ANY EPISODES SINCE.  PATIENT HAS REMAINED
CONFUSED T/O SHIFT BUT DOES SEEM TO BE IMPROVING SLIGHTLY.  PATIENT HAD NO
COMPLAINTS OF PAIN.  PATIENT HAD TMAX OF 99.0 DEGREES FAHRENHEIT.  PATIENT
DOES SEEEM TO BE LEANING TO THE RIGHT, EVEN AFTER REPOSITIONING.  PATIENT
SATTING WELL ON 2 L NC.  LUNG SOUNDS CLEAR T/O.  PATIENT CONTINUES TO HAVE AN
OCCASIONAL COUGH BUT SWALLOWS ANYTHING THAT COMES UP.  PATIENT REMAINS IN A.
FIB WITH BBB.  HR 90S TO 130S.  BP MOSTLY STABLE.  PRN IV LABETALOL GIVEN
TWICE FOR SBP OVER 160.  PATIENT TOLERATING ADA DIET WITHOUT STRAWS WELL.
PATIENT CONTINUES TO HAVE LOOSE STOOLS.  DELGADILLO REMAINS DRAINING DARK YELLOW
URINE.  40 MG LASIX GIVEN DURING SHIFT.  NO CHANGE TO SKIN.  NS TKO.  PATIENT
SEEN BY ST, OT, AND PT TODAY.  PATIENT GIVEN COMPLETE BED BATH.  PATIENT'S
WIFE HAS BEEN IN ROOM MOST OF THE DAY.  PATIENT HAS NO COMPLAINTS AT THIS
TIME.  BED LOW, CALL LIGHT IN REACH.  WILL CONTINUE TO MONITOR PATIENT
FREQUENTLY UNTIL REPORT GIVEN TO ONCOMING NIGHT SHIFT NURSE SHORTLY.

## 2019-02-27 NOTE — NUR
ASSUMED CARE OF PT AT 1900. REPORT RECEIVED AT BEDSIDE. PT PRESENTS IN BED.
ALERT AND ORIENTED. PLEASANT AND COOPERATIVE WITH CARE AND ASSESSMENT. DENIES
PAIN AT THIS TIME. DOES HAVE CONCERNS FOR HIS BOWEL AND NEEDING BEDPAN. WAS
UNSUCESSFUL. PT IN ATTENDS WHICH HE STATES IT MAKES HIM FEEL MORE SECURE AND
COMFORTABLE. PT HAS NO DYSPNEA AT THIS TIME. PT'S WIFE TO SPEND THE NIGHT WITH
PT. WILL REVIEW CHART AND PLAN OF CARE FOR THIS PT.

## 2019-02-27 NOTE — NUR
INITIAL ASSESSMENT
  PATIENT RESTING IN BED QUIETLY UPON ENTERING ROOM.  PATIENT CALM AND
COOPERATIVE.  PATIENT VERY CONFUSED THIS AM.  PATIENT ORIENTED TO FAMILY AND
FOLLOWING COMMANDS.  WHEN ASKED WHERE THE PATIENT WAS, HE REPLIED "PARTICLE
BOARD".  OTHER THINGS THE PATIENT SAYS MAKES IT SOUND LIKE HE THINKS HE IS AT
WORK OR NEEDS TO GET TO WORK.  PATIENT HAS RASPY, QUIET VOICE.  WIFE STATES
THAT THIS IS NORMAL FOR PATIENT.  WIFE ALSO STATES THAT PATIENT IS NORMALLY
ALERT AND ORIENTED X 4.  PATIENT AGITATED AND PARANOID SOMETIMES.  PATIENT
WILL BE PLEASANT AND THEN ALL OF A SUDDEN SAY THINGS LIKE "WATCH YOUR BACK.
YOU ARE TRYING TO KILL ME", ETC.  PATIENT AFEBRILE.  PATIENT HAS NO COMPLAINTS
OF PAIN AT THIS TIME.  PATIENT SATTING OVER 90% ON 2 L NC.  LUNGS ARE CLEAR
THROUGHOUT.  PATIENT HAS OCCASIONAL, NONPRODUCTIVE COUGH.  PATIENT IN A. FIB
WITH BBB.  HR 1-TEENS TO 130S.  BP STABLE.  PULSES STRONG.  TRACE EDEMA NOTED
TO BLES, 1+ EDEMA NOTED TO BILAT FEET.  SCDS IN PLACE.  ABDOMEN MODERATELY
DISTENDED, SOFT, WITH NORMOACTIVE BS.  C.DIFF CULTURE CAME BACK NEGATIVE.
DELGADILLO DRAINING ADEQUATE AMOUNT OF DARK YELLOW URINE.  COCCYX REDDENED, BRUISES
SCATTERED TO BUES AND ABDOMEN.  FONTANELLES ON SIDE OF HEAD NOTED TO BE
SUNKEN.  NS TKO.  BED LOW, CALL LIGHT IN REACH.  WILL CONTINUE TO MONITOR
PATIENT FREQUENTLY THROUGHOUT SHIFT.

## 2019-02-27 NOTE — NUR
NEURO REASSESSMENT
 
PT CONTINUES TO BE CONFUSED REGARDING SITUATION, AS HE FREQUENTLY ASKS
QUESTIONS ABOUT LEAVING AND VARIOUS ITEMS IN THE BED. HOWEVER, PT ABLE TO
STATE HE IS IN THE HOSPITAL, HIS FULL NAME, AND THE YEAR. OVER LAST FEW HOURS
PT HAS ALLOWED OXYGEN, BP CUFF, AND HEART MONITOR TO STAY IN PLACE. PT HAS HAD
INCREASED ABILITY TO HOLD PURPOSEFUL CONVERSATION.

## 2019-02-27 NOTE — NUR
SUMMARY
 
SINCE PREVIOUS NOTE, NO CHANGES. VITALS CONTINUE TO BE STABLE. SEE
ASSESSMENTS/FLOWSHEETS.

## 2019-02-27 NOTE — NUR
SPEECH AND PHYSICAL THERAPY IN WORKING WITH PATIENT AT THIS TIME.  WIFE IS IN
ROOM.  WIFE ASKED ABOUT PATIENT'S USE OF ALCOHOL AS PATIENT CONTINUES TO BE
MOSTLY CONFUSED, BELIEVING HE IS AT WORK.  WIFE STATES THAT PATIENT HAD LONG
HISTORY OF ALCOHOL USE BUT STOPPED AROUND 3 MONTHS AGO AND DOES NOT BELIEVE
THAT HE HAS BEEN SNEAKING ANY.

## 2019-02-27 NOTE — NUR
PATIENT RESTING QUIETLY IN BED WITH WIFE AT BEDSIDE.  PATIENT REMAINS
PLEASANTLY CONFUSED.  PATIENT DENIES PAIN OR DISCOMFORT.  PATIENT HAS TEMP OF
99.0 DEGREES FAHRENHEIT.  PATIENT REMAINS SATTING WELL ON 2 L NC.  PATIENT HR
90S TO 1-TEENS.  BP STABLE AFTER GIVING 20 MG PRN IV LABETALOL.  PATIENT
TOLERATING ADA DIET WELL.  NO OTHER ACUTE CHANGES TO NOTE ON AT THIS TIME.
WIFE AT BEDSIDE.  WILL CONTINUE TO MONITOR.

## 2019-02-27 NOTE — NUR
PATIENT RESTING QUIETLY IN BED, WIFE AT BEDSIDE.  PATIENT HAS NO COMPLAINTS.
PATIENT AFEBRILE.  PATIENT REMAINS SATTING WELL ON 2 L NC.  PATIENT IN A. FIB
WITH BBB, HR LOW 100S TO 1-TEENS.  BP STABLE AT THIS TIME.  COVERAGE PROVIDED
FOR BLOOD SUGAR .  PATIENT HAD LOOSE STOOL.  PATIENT RECEIVED COMPLETE
BED BATH.  PATIENT SEEMS TO BE LEANING TO THE RIGHT SIDE.  NO OTHER ACUTE
CHANGES TO NOTE ON AT THIS TIME.  WILL CONTINUE TO MONITOR.

## 2019-02-27 NOTE — NUR
SPOKE TO DR. WHITE AND UPDATED ON PATIENT.  INFORMED DOCTOR ABOUT SWALLOW
EVAL RESULTS.  ALSO INFORMED ABOUT PATIENT'S NOTED SUNKEN FONTANELLES AND THAT
WIFE REPORTED THAT PATIENT HAS ABUSED ALCOHOL LONG TERM BUT THAT HE STOPPED
DRINKING 3 MONTHS AGO.  INFORMED THAT PATIENT CONTINUES TO BE VERY CONFUSED
AND PARANOID AT TIMES.  ORDERS RECEIVED.  WILL CONTINUE TO MONITOR.

## 2019-02-27 NOTE — NUR
HAVE BEEN ABLE TO TITRATE O2 DOWN TO 1 L/M PER NASAL CANNULA. ON ROOM AIR, PT
TENDS TO DROP SATURATIONS TO 87-89 PERCENT. WILL CONTINUE TO MONITOR FOR
ABILITY TO TITRATE OXYGEN TO OFF. WIFE ROOMS IN FOR THE NIGHT. WILL CONTINUE
TO MONITOR PT.

## 2019-02-28 LAB
BASOPHILS # BLD AUTO: 0.02 K/MM3 (ref 0–0.23)
BASOPHILS NFR BLD AUTO: 0 % (ref 0–2)
DEPRECATED RDW RBC AUTO: 64.4 FL (ref 35.1–46.3)
EOSINOPHIL # BLD AUTO: 0.11 K/MM3 (ref 0–0.68)
EOSINOPHIL NFR BLD AUTO: 1 % (ref 0–6)
ERYTHROCYTE [DISTWIDTH] IN BLOOD BY AUTOMATED COUNT: 16.6 % (ref 11.7–14.2)
HCT VFR BLD AUTO: 39.8 % (ref 37–53)
HGB BLD-MCNC: 12.3 G/DL (ref 13.5–17.5)
IMM GRANULOCYTES # BLD AUTO: 0.07 K/MM3 (ref 0–0.1)
IMM GRANULOCYTES NFR BLD AUTO: 1 % (ref 0–1)
LYMPHOCYTES # BLD AUTO: 1.66 K/MM3 (ref 0.84–5.2)
LYMPHOCYTES NFR BLD AUTO: 14 % (ref 21–46)
MCHC RBC AUTO-ENTMCNC: 30.9 G/DL (ref 31.5–36.5)
MCV RBC AUTO: 104 FL (ref 80–100)
MONOCYTES # BLD AUTO: 1.51 K/MM3 (ref 0.16–1.47)
MONOCYTES NFR BLD AUTO: 13 % (ref 4–13)
NEUTROPHILS # BLD AUTO: 8.6 K/MM3 (ref 1.96–9.15)
NEUTROPHILS NFR BLD AUTO: 72 % (ref 41–73)
NRBC # BLD AUTO: 0.03 K/MM3 (ref 0–0.02)
NRBC BLD AUTO-RTO: 0.3 /100 WBC (ref 0–0.2)
PLATELET # BLD AUTO: 64 K/MM3 (ref 150–400)

## 2019-02-28 NOTE — NUR
ASSUMED CARE OF PT AT 1900. REPORT RECEIVED. PT PRESENTS IN BED. ALERT AND
PLEASANTLY CONFUSED AT TIMES. PT BELIEVES HE IS HEARING CATS. DOES KNOW THAT
HE IS IN [place] BUT BELIEVES THAT IT IS 1980. WIFE, OTIS AT BEDSIDE DOES
REORIENT HIM. PT CURRENTLY ON ROOM AIR AND MAINTAINS OXYGEN SATURATIONS > 90
PERCENT. WILL CONTINUE TO MONITOR PT. WILL REVIEW CHART AND PLAN OF CARE FOR
THIS PT.

## 2019-02-28 NOTE — NUR
HAVE MAINTAINED PT ON 1 LITER PER MINUTE OXYGEN PER NASAL CANNULA. HAS NO
COMPLAINTS OF DYSPNEA. PT HAS MOIST NONPRODUCTIVE COUGH. ONLY COMPLAINT
OFFERED BY PT IS THAT HIS THROAT IS SORE. DID GET ORDER FOR CEPACHOL WHICH PT
STATES IS HELPFUL. HAS MILD DISORIENTATION. IS NOT ALWAYS CERTAIN WHERE HE IS
AT THIS TIME. HAS TOLERATED Q 2 HOUR TURNS IN BED. WILL CONTINUE TO MONITOR
PT, AND WILL REPORT OFF TO ONCOMING RN.

## 2019-02-28 NOTE — NUR
UPDATE
PT. RESTING COMFORTABLY IN BED. PT. CONTINUES TO DENY PAIN. VSS. ON ROOM AIR
WHILE RESTING COMFORTABLY; SPO2 92-95%, USES 2LNC WHEN EATING OR WITH ANY
ACTIVITY. CALL LIGHT IN REACH, WIFE REMAINS AT BEDSIDE.

## 2019-02-28 NOTE — NUR
ASSUMED CARE
PT. ALERT AND ORIENTED THIS AM. PT. SITTING UP IN BED THIS AM, CURRENTLY ON
1LNC; BEING TITRATED DOWN WITH SPO2 94%. PT. WIFE REMAINS AT BEDSIDE. VSS THIS
AM. PT. ABLE TO FEED SELF BREAKFAST THIS AM. SWALLOWING PILLS WHOLE WITH
APPLESAUCE. PT. DENIES PAIN THIS AM. DELGADILLO REMAINS IN PLACE. NADN. CALL LIGHT
IN REACH.

## 2019-02-28 NOTE — NUR
SHIFT SUMMARY
PT. REMAINS ALERT AND ORIENTED T/O DAY TO LOCATION AND FAMILY. PT. UP TO
BEDSIDE CHAIR WITH LIFT TODAY. ABLE TO WORK WITH PHYSICAL THERAPY. PT. ON RA
WHILE AWAKE BUT REQUIRED 2LNC WHILE SLEEPING. VSS T/O SHIFT. NO ACUTE CHANGES.
PT. WIFE REMAINED AT BEDSIDE T/O DAY. PT ABLE TO FEED SELF TODAY WITH MINIMAL
ASSISTANCE. ELIE REMAINS IN PLACED DRAINING TO GRAVITY FOR STRICT I&O WHILE
DIURESING. REPORT TO ONCOMING RN.

## 2019-02-28 NOTE — NUR
- hold med due to GIB     WHEN PT ASLEEP, HE WOULD DESATURATE TO 87-88 PERCENT ON ROOM AIR. PLACED O2
BACK ON AT 2 L/M PER NASAL CANNULA. PT CURRENTLY MAINTAINING SATURATION > 90
PERCENT. WILL CONTINUE TO MONITOR PT.

## 2019-03-01 LAB
ANION GAP SERPL CALCULATED.4IONS-SCNC: 6 MMOL/L (ref 6–16)
BUN SERPL-MCNC: 39 MG/DL (ref 8–24)
CALCIUM SERPL-MCNC: 8.8 MG/DL (ref 8.5–10.1)
CHLORIDE SERPL-SCNC: 108 MMOL/L (ref 98–108)
CO2 SERPL-SCNC: 30 MMOL/L (ref 21–32)
CREAT SERPL-MCNC: 0.9 MG/DL (ref 0.6–1.2)
DEPRECATED RDW RBC AUTO: 62.3 FL (ref 35.1–46.3)
ERYTHROCYTE [DISTWIDTH] IN BLOOD BY AUTOMATED COUNT: 16.6 % (ref 11.7–14.2)
GLUCOSE SERPL-MCNC: 153 MG/DL (ref 70–99)
HCT VFR BLD AUTO: 39.9 % (ref 37–53)
HGB BLD-MCNC: 12.5 G/DL (ref 13.5–17.5)
MCHC RBC AUTO-ENTMCNC: 31.3 G/DL (ref 31.5–36.5)
MCV RBC AUTO: 101 FL (ref 80–100)
NRBC # BLD AUTO: 0.03 K/MM3 (ref 0–0.02)
NRBC BLD AUTO-RTO: 0.2 /100 WBC (ref 0–0.2)
PLATELET # BLD AUTO: 62 K/MM3 (ref 150–400)
POTASSIUM SERPL-SCNC: 3.8 MMOL/L (ref 3.5–5.5)
SODIUM SERPL-SCNC: 144 MMOL/L (ref 136–145)

## 2019-03-01 NOTE — NUR
PT HAS REMAINED AWAKE THROUGHOUT THE NIGHT. WIFE VOICES CONCERNS THAT HE HAS
NOT SLEPT IN OVER TWO DAYS. PT HAS BEEN NOTEABLEY DISORIENTED THIS DAY.
REMAINS PLEASANT THOUGH HAS VERY POOR SHORT TERM MEMORY. HAVE CHANGED DRESSING
ON PICC LINE. PT COMPLIANT DURING DRESSING CHANGE. HAS BEEN ON 1 LITER PER
MINUTE PER NASAL CANNULA. HAS MAINTAINED > 90 PERCENT SATURATIONS. PT HAS NO
VOICED COMPLAINTS OF DYSPNEA OR PAIN. WILL CONTINUE TO MONITOR PT, AND WILL
REPORT OFF TO ONCOMING RN.

## 2019-03-01 NOTE — NUR
PT PLEASANT TODAY, WIFE AT BEDSIDE MOST OF DAY. PT HAD AT LEAST 2 BM TODAY.
DENIES PAIN. DID HAVE 2000 URINE OUT THIS DANE. WIFE STATES IS MORE AWAKE AND
CLEAR TODAY THAN YEEST.  NO OTHER CONCERNS AT THIS SUKHDEEP. BED IN LOW POSITIION,
CALL LITE IN REACH, CALLS APPROP

## 2019-03-01 NOTE — NUR
PT PLEASANT FOLLOWS COMMANDS. SOME CONF AT TIMES, KNOWS , WIFE NAME,
 @60 YRS. [facility] [employer]. DENIES PAIN. STATES SOME NUMBNESS IN LEFT ARM
FROM OLD SURG. TALKS IN ALMOST LOUD WHISPER.  H/R IRREG, SEE STRIP. NO MURMER
NOTED. LUNGS CLEAR, RESP EASY, UNLABORED. ON 1L 02. BT X4 LAST BM LAST NITE.
VOIDS DELGADILLO CATH, DRAINING CLEAR YELOW FLUID. IS BEDBOUND AT THIS TIME. BED IN
LOW POSITION, CALL LITE IN REACH, WIFE AT BEDSIDE. CALLS REGULARLY.

## 2019-03-02 NOTE — NUR
SHIFT SUMMARY
PT EATING AND DRINKING WITH ASP PRECAUTIONS IN PLACE. PT AND FAMILY BEEN
ASSISTED WITH ADL'S PRN. PT BEEN REPOSITIONED MULT TIMES TODAY. PT HAS
PROTECTIVE DRESSING TO BOTTOM AND HEELS. PAS IN PLACE. BED ALARM IN PLACE.

## 2019-03-02 NOTE — NUR
SHIFT SUMMARY
PT ALERT, ORIENTED; TRANSFER FROM ICU APPROX. 2145. PT WIFE AT BEDSIDE T/O
SHIFT. VSS. CURRENTLY 92%+ ON RA; PT DENIES SOB, CP AND NAUSEA T/O SHIFT. ONE
SMALL SOFT INCONT. BM. ELIE PATENT. PICC LINE IN RUE WN. REPOSITIONED T/O
SHIFT. SIDE RAILS X3 AND BED ALARM. SCD'S TO BLE'S. REPORT GIVEN TO RASHI STAPLETON RN.

## 2019-03-03 LAB
ANION GAP SERPL CALCULATED.4IONS-SCNC: 4 MMOL/L (ref 6–16)
BUN SERPL-MCNC: 30 MG/DL (ref 8–24)
CALCIUM SERPL-MCNC: 8.3 MG/DL (ref 8.5–10.1)
CHLORIDE SERPL-SCNC: 106 MMOL/L (ref 98–108)
CO2 SERPL-SCNC: 31 MMOL/L (ref 21–32)
CREAT SERPL-MCNC: 0.67 MG/DL (ref 0.6–1.2)
DEPRECATED RDW RBC AUTO: 61.2 FL (ref 35.1–46.3)
ERYTHROCYTE [DISTWIDTH] IN BLOOD BY AUTOMATED COUNT: 16.3 % (ref 11.7–14.2)
GLUCOSE SERPL-MCNC: 142 MG/DL (ref 70–99)
HCT VFR BLD AUTO: 35.6 % (ref 37–53)
HGB BLD-MCNC: 11.3 G/DL (ref 13.5–17.5)
MCHC RBC AUTO-ENTMCNC: 31.7 G/DL (ref 31.5–36.5)
MCV RBC AUTO: 100 FL (ref 80–100)
NRBC # BLD AUTO: 0 K/MM3 (ref 0–0.02)
NRBC BLD AUTO-RTO: 0 /100 WBC (ref 0–0.2)
PLATELET # BLD AUTO: 39 K/MM3 (ref 150–400)
POTASSIUM SERPL-SCNC: 3.4 MMOL/L (ref 3.5–5.5)
SODIUM SERPL-SCNC: 141 MMOL/L (ref 136–145)

## 2019-03-03 NOTE — NUR
SHIFT SUMMARY
PT EATING AND DRINKING WITH ASP PRECAUTIONS IN PLACE. PT FAMILY IN ROOM MOST
OF DAY. PT BEEN REPOSITIONED MULT TIMES TODAY. PT HAD BM'S TODAY. PT HAS
PROTECTIVE DRESSINGS ON BOTTOM AND HEELS. PAS TO BLE IN PLACE. BLE ELEVATED ON
PILLOWS. BED ALARM IN PLACE. ASSISTED WITH ADL'S PRN.

## 2019-03-03 NOTE — NUR
SUMMARY: NO CHANGE TONIGHT. PT HAS HAD SOME PLEASENT CONFUSION, TAKING OFF
CLOTHES, PT IS ORIENTED TO SELF, PLACE AND FOLLOWS DIRECTIONS. NO ATTEMPTS
OOB, BED ALARM ON FOR SAFETY. VSS, PT REPOSITIONED Q2 PRN, SKIN IS INTACT,
MEPILEX TO COCCYX.  TOLERATING RA, LUNGS ARE CLEAR. NO ACUTE CONCERNS AT THIS
TIME. PLAN IS ENCOURAGE MOBILITY, PT/OT CARE AND SNF ON MONDAY. WIFE AT
BEDSIDE.

## 2019-03-04 LAB
ANION GAP SERPL CALCULATED.4IONS-SCNC: 6 MMOL/L (ref 6–16)
BUN SERPL-MCNC: 27 MG/DL (ref 8–24)
CALCIUM SERPL-MCNC: 7.5 MG/DL (ref 8.5–10.1)
CHLORIDE SERPL-SCNC: 111 MMOL/L (ref 98–108)
CO2 SERPL-SCNC: 28 MMOL/L (ref 21–32)
CREAT SERPL-MCNC: 0.6 MG/DL (ref 0.6–1.2)
DEPRECATED RDW RBC AUTO: 60.8 FL (ref 35.1–46.3)
ERYTHROCYTE [DISTWIDTH] IN BLOOD BY AUTOMATED COUNT: 16.3 % (ref 11.7–14.2)
GLUCOSE SERPL-MCNC: 152 MG/DL (ref 70–99)
HCT VFR BLD AUTO: 33.8 % (ref 37–53)
HGB BLD-MCNC: 10.6 G/DL (ref 13.5–17.5)
MCHC RBC AUTO-ENTMCNC: 31.4 G/DL (ref 31.5–36.5)
MCV RBC AUTO: 101 FL (ref 80–100)
NRBC # BLD AUTO: 0 K/MM3 (ref 0–0.02)
NRBC BLD AUTO-RTO: 0 /100 WBC (ref 0–0.2)
PLATELET # BLD AUTO: 38 K/MM3 (ref 150–400)
POTASSIUM SERPL-SCNC: 3.3 MMOL/L (ref 3.5–5.5)
SODIUM SERPL-SCNC: 145 MMOL/L (ref 136–145)

## 2019-03-04 NOTE — NUR
[facility] cath removed physical therapy by to see pt also  dr levi by to see pt
oral kdur given with po meds this am plan to go to snf if bed avail they re
uvnrc

## 2019-03-04 NOTE — NUR
SHIFT SUMMARY:
NO ACUTE CHANGES TO REPORT THIS SHIFT. PT IS A&O X 4, COOPERATIVE AND
PLEASANT. WIFE AT BEDSIDE T/O NIGHT AIDNING IN CARE. PT IS EXTREMELY
DECONDITIONED AND WEAK; CLASSIFYING AS A LIFT PT; Q2H TURNS PERFORMED. HEEL
PROTECTORS IN PLACE. PT HAD  INCREASED CONFUSION DURING THE NIGHT AND BEGAN
STRIPPING CLOTHES AWAY, HOWEVER IS REDIRECTABLE. BED ALARM ON FOR SAFETY. RESP
E/U ON ROOM AIR, SOB WHEN LAYING FLAT. DELGADILLO PATENT + DRAINING CLEAR YELLOW
URINE. PT IS CONTINENT/INCONTINENT OF BOWEL TONIGHT, REQUESTS BEDPAN.
SKIN BREAKDOWN NOTED TO PT'S BOTTOM; MEPILEX APPLIED. PT DENIES PAIN OR ANY
DISCOMFORT. REQUESTS TO HAVE DELGADILLO TAKEN OUT AND BEGIN BLADDER TRAINING TODAY
IF POSSIBLE (PT DID NOT WANT TO START THIS LAST NIGHT AS HE MAY TEND TO BE
INCONTINENT IN HIS SLEEP). CRITICAL PLATELET THIS AM OF 38. NO CHANGES IN
CONDITION. PT WILL POSSIBLY BE D/C'ing TO A SNF TODAY FOR REHAB; PT AND WIFE
STATES [facility] IS WHAT THEY ARE HOPING FOR. WILL CONT TO MONITOR AND
PROVIDE CARE UNTIL PRESUMED BY ONCOMING RN.

## 2019-03-04 NOTE — NUR
pt awake sitting in bed has hoarse voice stated his breathing is better ls
clear but dom in bases pt able to pull himself up to let me listen without
difficulty stated when he gets oob he does get sob

## 2019-03-05 NOTE — NUR
SUMMARY: NO ACUTE CHANGES THIS SHIFT, VSS, AFEBRILE. PT VOICE REMAINS RASPY
AND PT'S WIFE STATES HE IS NORMALLY SOFTSPOKEN. VOIDING CLEAR, PADMA URINE
INTO URINAL WITH WIFE ASSIST AT BEDSIDE. ANTICIPATE DC TO [facility]
LATER THIS DAY.

## 2019-03-05 NOTE — NUR
pt done eating breakfast yesterday pt ls clear prior to eating breakfast wife
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
discussed wtih them working long term with judi gaitan

## 2020-12-17 NOTE — NUR
ASSUMED CARE
BEDSIDED REPORT RECEIVED FROM RUI RN; PT A&O X 4; [place]; DENIES CHEST PAIN;
VSS W/ AFIB NOTED ON TELE; O2 SATS >93 ON 2L NC; WHEEZES NOTED; 1 UNIT PRBC
INFUSING AT 125ML/HR; AFEBRILE; ORIENTED TO UNIT SAFETY AND PROTOCOL; CALL
LIGHT IN REACH; BED IN LOWEST POSITION.

## 2020-12-17 NOTE — NUR
UPDATE
CRITICAL LAB VALUE REPORTED OF PLATELETS 16; LAURA BEGUM NOTIFIED; ORDERS
GIVEN TO CONTINUE TO MONITOR PT AND ASSESS FOR SIGNS OF BLEEDING. IF BLEEDING
NOTED, CALL PROVIDER AND CONSIDER PLATELET TRANSFUSION.

## 2020-12-17 NOTE — NUR
LS WHEEES NOTED T/O OM ARRIVAL, DR RAHMAN IS CALLED AS PT STS SOB BEGAN WITH
TRANSFUSION. DR RAHMAN STS THAT WHEN SHE ASSESSED PT IN THE ER PRIOR TO THE
TRANSFUSION BEGINING PT HAD PROFOUDN WHEEZING T/O AND INSTRUCTS THAT
TRANSFUSION BE RESTARTED, AND NEW ORDER OBTAINED FOR SOLUMEDROL. PRN BREATHING
TX ARE ORDERED ON ARRIVAL

## 2020-12-18 NOTE — NUR
DC'D HOME ON HOSPICE, INSTRUCTIONS GIVEN TO SPOUSE WITH CLEAR UNDERSTANDING.
MEDS REVIEWED WITH SPOUSE BY HOSPICE RN.

## 2020-12-18 NOTE — NUR
SHIFT SUMMARY
PT A&O X 4; VSS; AFIB NOTED ON TELE W/ HR ; O2 SATS >93 ON 2L NC; 1 UNIT
PRBC THIS SHIFT; PLATELET CONTINUED TO DECREASE T/O SHIFT, AM LAB RESULT OF
PLT 12; USES URINAL IN BED W/ ASSISTANCE, AS PT STATES HE HAS SECONDARY
LEFT HAND NUMBNESS FROM BACK INJURY; ASSISTED TO BSC FOR BM 1X THIS SHIFT, NO
MELENA NOTED; PT CALLS APPROPRIATELY AND MAKES NEEDS KNOWN; CALL LIGHT IN
REACH; BED IN LOWEST POSITION; WILL CONTINUE TO MONITOR CLOSELY UNTIL HAND OFF
TO DAY SHIFT RN.